# Patient Record
Sex: MALE | Race: WHITE | NOT HISPANIC OR LATINO | Employment: FULL TIME | URBAN - METROPOLITAN AREA
[De-identification: names, ages, dates, MRNs, and addresses within clinical notes are randomized per-mention and may not be internally consistent; named-entity substitution may affect disease eponyms.]

---

## 2018-07-24 ENCOUNTER — OFFICE VISIT (OUTPATIENT)
Dept: INTERNAL MEDICINE | Facility: CLINIC | Age: 58
End: 2018-07-24
Payer: COMMERCIAL

## 2018-07-24 VITALS
OXYGEN SATURATION: 98 % | WEIGHT: 187 LBS | RESPIRATION RATE: 10 BRPM | DIASTOLIC BLOOD PRESSURE: 70 MMHG | HEART RATE: 74 BPM | BODY MASS INDEX: 36.71 KG/M2 | HEIGHT: 60 IN | SYSTOLIC BLOOD PRESSURE: 118 MMHG

## 2018-07-24 DIAGNOSIS — Z00.00 WELLNESS EXAMINATION: Primary | ICD-10-CM

## 2018-07-24 PROCEDURE — 99396 PREV VISIT EST AGE 40-64: CPT | Mod: ,,, | Performed by: INTERNAL MEDICINE

## 2018-07-24 RX ORDER — THYROID, PORCINE 90 MG/1
90 TABLET ORAL DAILY
COMMUNITY
Start: 2018-07-13 | End: 2022-03-14

## 2018-07-24 RX ORDER — CYANOCOBALAMIN 1000 UG/ML
INJECTION, SOLUTION INTRAMUSCULAR; SUBCUTANEOUS
Refills: 2 | COMMUNITY
Start: 2018-07-05 | End: 2022-03-14

## 2018-07-24 RX ORDER — CHOLECALCIFEROL (VITAMIN D3) 50 MCG
50000 TABLET ORAL
COMMUNITY
Start: 2018-07-05

## 2018-07-24 RX ORDER — LIFITEGRAST 50 MG/ML
1 SOLUTION/ DROPS OPHTHALMIC 2 TIMES DAILY
COMMUNITY
Start: 2018-07-05 | End: 2020-08-17

## 2018-07-24 NOTE — PROGRESS NOTES
SUBJECTIVE:    Patient ID: Ameya Burleson is a 57 y.o. male.    Chief Complaint: Annual Exam (go over test results on his lap top/ script for massage); Urinary Frequency; and Fatigue    HPI     Pt in for annual exam--he brought his labs to review-he will send us his values-all good      Past Medical History:   Diagnosis Date    Hyperthyroidism      Social History     Social History    Marital status:      Spouse name: N/A    Number of children: N/A    Years of education: N/A     Occupational History    Not on file.     Social History Main Topics    Smoking status: Never Smoker    Smokeless tobacco: Never Used    Alcohol use No    Drug use: No    Sexual activity: Not on file     Other Topics Concern    Not on file     Social History Narrative    No narrative on file     History reviewed. No pertinent surgical history.  History reviewed. No pertinent family history.  Vitals:    07/24/18 1007   BP: 118/70   Pulse: 74   Resp: 10   SpO2: 98%   Weight: 84.8 kg (187 lb)   Height: 5' (1.524 m)       Review of Systems   Constitutional: Negative for appetite change, chills, diaphoresis, fatigue, fever and unexpected weight change.   HENT: Negative for congestion, ear pain, hearing loss, nosebleeds, postnasal drip, sinus pain, sinus pressure, sneezing, sore throat, tinnitus and trouble swallowing.    Eyes: Negative for photophobia, pain and visual disturbance.   Respiratory: Negative for apnea, cough, choking, chest tightness, shortness of breath and wheezing.    Cardiovascular: Negative for chest pain, palpitations and leg swelling.   Gastrointestinal: Negative for abdominal distention, abdominal pain, blood in stool, constipation, diarrhea, nausea and vomiting.   Endocrine: Negative for cold intolerance, heat intolerance, polydipsia and polyuria.   Genitourinary: Positive for frequency. Negative for difficulty urinating, dysuria, flank pain, hematuria and urgency.   Musculoskeletal: Negative for  arthralgias, back pain, joint swelling, myalgias, neck pain and neck stiffness.   Skin: Negative for pallor and rash.   Allergic/Immunologic: Negative for environmental allergies and food allergies.   Neurological: Negative for dizziness, tremors, seizures, syncope, speech difficulty, weakness, light-headedness, numbness and headaches.   Hematological: Does not bruise/bleed easily.   Psychiatric/Behavioral: Negative for agitation, confusion, decreased concentration, sleep disturbance and suicidal ideas. The patient is not nervous/anxious.           Objective:      Physical Exam   Constitutional: He is oriented to person, place, and time. He appears well-developed and well-nourished. He is cooperative. No distress.   HENT:   Head: Normocephalic and atraumatic.   Nose: Nose normal.   Mouth/Throat: Uvula is midline, oropharynx is clear and moist and mucous membranes are normal.   Eyes: Conjunctivae, EOM and lids are normal. Pupils are equal, round, and reactive to light. Right pupil is round and reactive. Left pupil is round and reactive.   Neck: Normal range of motion. Neck supple. Carotid bruit is not present. No thyromegaly present.   Cardiovascular: Normal rate, regular rhythm, normal heart sounds and intact distal pulses.    Pulmonary/Chest: Effort normal and breath sounds normal.   Abdominal: Soft. Bowel sounds are normal. He exhibits no mass. There is no hepatosplenomegaly. There is no tenderness. There is no rigidity. No hernia.   Genitourinary:   Genitourinary Comments: Prostate normal   Musculoskeletal: Normal range of motion.   Lymphadenopathy:     He has no cervical adenopathy.     He has no axillary adenopathy.   Neurological: He is alert and oriented to person, place, and time.   Skin: Skin is warm and dry. No cyanosis. Nails show no clubbing.   Psychiatric: He has a normal mood and affect. His speech is normal.   Nursing note and vitals reviewed.          Assessment:       1. Wellness examination          Plan:           Wellness examination

## 2019-08-14 ENCOUNTER — OFFICE VISIT (OUTPATIENT)
Dept: FAMILY MEDICINE | Facility: CLINIC | Age: 59
End: 2019-08-14
Payer: COMMERCIAL

## 2019-08-14 VITALS
WEIGHT: 180 LBS | TEMPERATURE: 99 F | OXYGEN SATURATION: 98 % | HEART RATE: 76 BPM | RESPIRATION RATE: 14 BRPM | BODY MASS INDEX: 35.34 KG/M2 | HEIGHT: 60 IN | SYSTOLIC BLOOD PRESSURE: 130 MMHG | DIASTOLIC BLOOD PRESSURE: 82 MMHG

## 2019-08-14 DIAGNOSIS — Z12.5 SPECIAL SCREENING FOR MALIGNANT NEOPLASM OF PROSTATE: ICD-10-CM

## 2019-08-14 DIAGNOSIS — E29.1 TESTOSTERONE DEFICIENCY IN MALE: ICD-10-CM

## 2019-08-14 DIAGNOSIS — D75.1 POLYCYTHEMIA, SECONDARY: ICD-10-CM

## 2019-08-14 DIAGNOSIS — Z00.00 ROUTINE GENERAL MEDICAL EXAMINATION AT A HEALTH CARE FACILITY: Primary | ICD-10-CM

## 2019-08-14 DIAGNOSIS — E03.9 HYPOTHYROIDISM (ACQUIRED): ICD-10-CM

## 2019-08-14 DIAGNOSIS — E55.9 VITAMIN D DEFICIENCY: ICD-10-CM

## 2019-08-14 DIAGNOSIS — M15.9 PRIMARY OSTEOARTHRITIS INVOLVING MULTIPLE JOINTS: ICD-10-CM

## 2019-08-14 DIAGNOSIS — E78.00 ELEVATED LDL CHOLESTEROL LEVEL: ICD-10-CM

## 2019-08-14 DIAGNOSIS — Z23 NEED FOR SHINGLES VACCINE: ICD-10-CM

## 2019-08-14 PROCEDURE — 99999 PR PBB SHADOW E&M-EST. PATIENT-LVL IV: CPT | Mod: PBBFAC,,, | Performed by: INTERNAL MEDICINE

## 2019-08-14 PROCEDURE — 99396 PR PREVENTIVE VISIT,EST,40-64: ICD-10-PCS | Mod: S$GLB,,, | Performed by: INTERNAL MEDICINE

## 2019-08-14 PROCEDURE — 99999 PR PBB SHADOW E&M-EST. PATIENT-LVL IV: ICD-10-PCS | Mod: PBBFAC,,, | Performed by: INTERNAL MEDICINE

## 2019-08-14 PROCEDURE — 99396 PREV VISIT EST AGE 40-64: CPT | Mod: S$GLB,,, | Performed by: INTERNAL MEDICINE

## 2019-08-14 RX ORDER — TADALAFIL 20 MG/1
20 TABLET ORAL DAILY
COMMUNITY
End: 2023-03-15

## 2019-08-14 RX ORDER — TESTOSTERONE CYPIONATE 200 MG/ML
100 INJECTION, SOLUTION INTRAMUSCULAR
Refills: 2 | COMMUNITY
Start: 2019-05-23 | End: 2022-03-14

## 2019-08-14 NOTE — PROGRESS NOTES
SUBJECTIVE:    Patient ID: Ameya Burleson is a 59 y.o. male.    Chief Complaint: Annual Exam    HPI     In for yearly check-      No visits with results within 6 Month(s) from this visit.   Latest known visit with results is:   No results found for any previous visit.       Past Medical History:   Diagnosis Date    Hyperthyroidism     Hypothyroidism (acquired) 8/14/2019    Vitamin D deficiency 8/14/2019     History reviewed. No pertinent surgical history.  Family History   Problem Relation Age of Onset    Vision loss Mother     Diabetes Father     Cancer Maternal Grandmother         skin       Marital Status:   Alcohol History:  reports that he does not drink alcohol.  Tobacco History:  reports that he has never smoked. He has never used smokeless tobacco.  Drug History:  reports that he does not use drugs.    Review of patient's allergies indicates:  No Known Allergies    Current Outpatient Medications:     ARMOUR THYROID 90 mg Tab, Take 90 mg by mouth once daily., Disp: , Rfl:     cholecalciferol, vitamin D3, 50,000 unit capsule, Take 50,000 Units by mouth once daily., Disp: , Rfl:     cyanocobalamin 1,000 mcg/mL injection, , Disp: , Rfl: 2    NON FORMULARY MEDICATION, DIM, Disp: , Rfl:     NON FORMULARY MEDICATION, adrecor, Disp: , Rfl:     NON FORMULARY MEDICATION, pregnenolone, Disp: , Rfl:     prasterone, dhea, (DHEA) 50 mg Tab, Take by mouth., Disp: , Rfl:     tadalafil (CIALIS) 20 MG Tab, Take 20 mg by mouth once daily., Disp: , Rfl:     testosterone cypionate (DEPOTESTOTERONE CYPIONATE) 200 mg/mL injection, Inject 100 mg into the muscle every 14 (fourteen) days. , Disp: , Rfl: 2    turmeric (CURCUMIN MISC), by Misc.(Non-Drug; Combo Route) route., Disp: , Rfl:     XIIDRA 5 % Dpet, Place 1 drop into both eyes 2 (two) times daily., Disp: , Rfl:     adjuvant AS01B, PF,vial 1 of 2 (SHINGRIX ADJUVANT COMPONENT-PF) Susp, Inject 0.5 mLs into the muscle once. for 1 dose, Disp: 0.5 mL, Rfl:  0    vit P2-hr-[id number]-me-B12-ALA (NUFOLA) 25-3.75-1-300 mg Cap, Take 1 capsule by mouth 2 (two) times daily., Disp: , Rfl:     Review of Systems   All other systems reviewed and are negative.         Objective:      Vitals:    08/14/19 1540   BP: 130/82   Pulse: 76   Resp: 14   Temp: 98.5 °F (36.9 °C)   SpO2: 98%   Weight: 81.6 kg (180 lb)   Height: 5' (1.524 m)     Physical Exam   Constitutional: He is oriented to person, place, and time. He appears well-developed and well-nourished. He is cooperative. No distress.   HENT:   Head: Normocephalic and atraumatic.   Mouth/Throat: Uvula is midline and mucous membranes are normal.   Eyes: Pupils are equal, round, and reactive to light. Conjunctivae and EOM are normal. Right eye exhibits no discharge. Left eye exhibits no discharge. No scleral icterus. Right pupil is round and reactive. Left pupil is round and reactive.   Neck: Normal range of motion. Neck supple. No JVD present. Carotid bruit is not present. No thyromegaly present.   Cardiovascular: Normal rate, regular rhythm, normal heart sounds and intact distal pulses. Exam reveals no gallop and no friction rub.   No murmur heard.  Pulmonary/Chest: Effort normal and breath sounds normal. He has no wheezes. He has no rales.   Abdominal: Soft. Bowel sounds are normal. He exhibits no distension, no abdominal bruit and no mass. There is no tenderness. There is no rigidity. No hernia.   Genitourinary: Prostate normal.   Musculoskeletal: Normal range of motion. He exhibits no edema.   Neurological: He is alert and oriented to person, place, and time.   Skin: Skin is warm and dry. Capillary refill takes less than 2 seconds. No lesion and no rash noted. No cyanosis. Nails show no clubbing.   Psychiatric: He has a normal mood and affect. His speech is normal and behavior is normal. Judgment and thought content normal.   Nursing note and vitals reviewed.        Assessment:       1. Routine general medical examination at a  health care facility    2. Hypothyroidism (acquired)    3. Vitamin D deficiency    4. Primary osteoarthritis involving multiple joints    5. Polycythemia, secondary    6. Elevated LDL cholesterol level    7. Testosterone deficiency in male    8. Special screening for malignant neoplasm of prostate    9. Need for shingles vaccine         Plan:       Routine general medical examination at a health care facility  -     Lipid panel; Future; Expected date: 08/14/2019  -     Cancel: Urinalysis    Hypothyroidism (acquired)  -     Cancel: TSH; Future; Expected date: 08/14/2019    Vitamin D deficiency  -     Vitamin D; Future; Expected date: 08/14/2019    Primary osteoarthritis involving multiple joints  -     Cancel: Comprehensive metabolic panel; Future; Expected date: 08/14/2019    Polycythemia, secondary  -     Cancel: CBC auto differential; Future; Expected date: 08/14/2019    Elevated LDL cholesterol level        -     Recheck LDL fasting    Testosterone deficiency in male        -     Evaluate levels    Special screening for malignant neoplasm of prostate  -     Cancel: PSA, Screening; Future; Expected date: 08/14/2019    Need for shingles vaccine  -     adjuvant AS01B, PF,vial 1 of 2 (SHINGRIX ADJUVANT COMPONENT-PF) Susp; Inject 0.5 mLs into the muscle once. for 1 dose  Dispense: 0.5 mL; Refill: 0      Follow up in about 1 year (around 8/14/2020) for annual exams.        8/14/2019 Dulce Knight M.D.

## 2020-08-06 ENCOUNTER — TELEPHONE (OUTPATIENT)
Dept: FAMILY MEDICINE | Facility: CLINIC | Age: 60
End: 2020-08-06

## 2020-08-06 DIAGNOSIS — E78.00 ELEVATED LDL CHOLESTEROL LEVEL: ICD-10-CM

## 2020-08-06 DIAGNOSIS — D75.1 POLYCYTHEMIA, SECONDARY: ICD-10-CM

## 2020-08-06 DIAGNOSIS — E03.9 HYPOTHYROIDISM (ACQUIRED): Primary | ICD-10-CM

## 2020-08-09 NOTE — PROGRESS NOTES
SUBJECTIVE:    Patient ID: Ameya Burleson is a 60 y.o. male.    Chief Complaint: Annual Exam    HPI     In for annual check.    Labs-laboratory studies include glucose of 86 sodium 140 potassium 4.0 BUN 19 creatinine 1.17 GFR 67 cc-cholesterol 181 HDL 36  TSH 3.34 hemoglobin 15.5 hematocrit 46.4 WBC 4400 platelet count [id number] wants to try natural approach to lowering cholesterol and he will recheck lipids in November-also [facility]    Health Maintenance         Date Due Completion Date    HIV Screening 07/28/1974 ---    Colorectal Cancer Screening 01/02/2000 1/1/2000 (Done)    Override on 1/1/2000: Done (mallory hope md)    Shingles Vaccine (1 of 2) 07/28/2009 ---    PROSTATE-SPECIFIC ANTIGEN 05/08/2019 5/8/2018 (Done)    Override on 5/8/2018: Done    Influenza Vaccine (1) 09/01/2020 11/29/2018    TETANUS VACCINE 02/05/2023 2/5/2013    Override on 2/5/2013: Done    Lipid Panel 11/11/2024 11/11/2019    Override on 5/8/2018: Done              Telephone on 08/06/2020   Component Date Value Ref Range Status    Glucose 08/12/2020 86  65 - 99 mg/dL Final    BUN, Bld 08/12/2020 19  7 - 25 mg/dL Final    Creatinine 08/12/2020 1.17  0.70 - 1.25 mg/dL Final    eGFR if non African American 08/12/2020 67  > OR = 60 mL/min/1.73m2 Final    eGFR if African American 08/12/2020 78  > OR = 60 mL/min/1.73m2 Final    BUN/Creatinine Ratio 08/12/2020 NOT APPLICABLE  6 - 22 (calc) Final    Sodium 08/12/2020 140  135 - 146 mmol/L Final    Potassium 08/12/2020 4.0  3.5 - 5.3 mmol/L Final    Chloride 08/12/2020 105  98 - 110 mmol/L Final    CO2 08/12/2020 28  20 - 32 mmol/L Final    Calcium 08/12/2020 8.9  8.6 - 10.3 mg/dL Final    Total Protein 08/12/2020 6.6  6.1 - 8.1 g/dL Final    Albumin 08/12/2020 4.0  3.6 - 5.1 g/dL Final    Globulin, Total 08/12/2020 2.6  1.9 - 3.7 g/dL (calc) Final    Albumin/Globulin Ratio 08/12/2020 1.5  1.0 - 2.5 (calc) Final    Total Bilirubin 08/12/2020 0.8  0.2 - 1.2 mg/dL Final     Alkaline Phosphatase 08/12/2020 68  35 - 144 U/L Final    AST 08/12/2020 21  10 - 35 U/L Final    ALT 08/12/2020 13  9 - 46 U/L Final    WBC 08/12/2020 4.4  3.8 - 10.8 Thousand/uL Final    RBC 08/12/2020 5.02  4.20 - 5.80 Million/uL Final    Hemoglobin 08/12/2020 15.5  13.2 - 17.1 g/dL Final    Hematocrit 08/12/2020 46.4  38.5 - 50.0 % Final    Mean Corpuscular Volume 08/12/2020 92.4  80.0 - 100.0 fL Final    Mean Corpuscular Hemoglobin 08/12/2020 30.9  27.0 - 33.0 pg Final    Mean Corpuscular Hemoglobin Conc 08/12/2020 33.4  32.0 - 36.0 g/dL Final    RDW 08/12/2020 12.8  11.0 - 15.0 % Final    Platelets 08/12/2020 112* 140 - 400 Thousand/uL Final    MPV 08/12/2020 10.8  7.5 - 12.5 fL Final    Neutrophils Absolute 08/12/2020 2,275  1,500 - 7,800 cells/uL Final    Lymph # 08/12/2020 1,452  850 - 3,900 cells/uL Final    Mono # 08/12/2020 502  200 - 950 cells/uL Final    Eos # 08/12/2020 110  15 - 500 cells/uL Final    Baso # 08/12/2020 62  0 - 200 cells/uL Final    Neutrophils Relative 08/12/2020 51.7  % Final    Lymph% 08/12/2020 33.0  % Final    Mono% 08/12/2020 11.4  % Final    Eosinophil% 08/12/2020 2.5  % Final    Basophil% 08/12/2020 1.4  % Final    TSH 08/12/2020 3.34  0.40 - 4.50 mIU/L Final    Cholesterol 08/12/2020 181  <200 mg/dL Final    HDL 08/12/2020 36* > OR = 40 mg/dL Final    Triglycerides 08/12/2020 96  <150 mg/dL Final    LDL Cholesterol 08/12/2020 125* mg/dL (calc) Final    Hdl/Cholesterol Ratio 08/12/2020 5.0* <5.0 (calc) Final    Non HDL Chol. (LDL+VLDL) 08/12/2020 145* <130 mg/dL (calc) Final       Past Medical History:   Diagnosis Date    Hyperthyroidism     Hypothyroidism (acquired) 8/14/2019    Vitamin D deficiency 8/14/2019     History reviewed. No pertinent surgical history.  Family History   Problem Relation Age of Onset    Vision loss Mother     Diabetes Father     Cancer Maternal Grandmother         skin       Marital Status:   Alcohol History:   reports no history of alcohol use.  Tobacco History:  reports that he has never smoked. He has never used smokeless tobacco.  Drug History:  reports no history of drug use.    Review of patient's allergies indicates:  No Known Allergies    Current Outpatient Medications:     ARMOUR THYROID 90 mg Tab, Take 90 mg by mouth once daily., Disp: , Rfl:     cholecalciferol, vitamin D3, 50,000 unit capsule, Take 50,000 Units by mouth once daily., Disp: , Rfl:     cyanocobalamin 1,000 mcg/mL injection, , Disp: , Rfl: 2    NON FORMULARY MEDICATION, DIM, Disp: , Rfl:     NON FORMULARY MEDICATION, pregnenolone, Disp: , Rfl:     tadalafil (CIALIS) 20 MG Tab, Take 20 mg by mouth once daily., Disp: , Rfl:     testosterone cypionate (DEPOTESTOTERONE CYPIONATE) 200 mg/mL injection, Inject 100 mg into the muscle every 14 (fourteen) days. , Disp: , Rfl: 2    turmeric (CURCUMIN MISC), by Misc.(Non-Drug; Combo Route) route., Disp: , Rfl:     UNABLE TO FIND, secretropin, Disp: , Rfl:     adjuvant AS01B, PF,vial 1 of 2 (SHINGRIX ADJUVANT COMPONENT-PF) Susp, Inject 0.5 mLs into the muscle once. for 1 dose, Disp: 0.5 mL, Rfl: 0    nystatin-triamcinolone (MYCOLOG II) cream, Apply topically 2 (two) times daily., Disp: 30 g, Rfl: 0    rosuvastatin (CRESTOR) 10 MG tablet, Take 1 tablet (10 mg total) by mouth once daily. (Patient not taking: Reported on 8/17/2020), Disp: 90 tablet, Rfl: 3    Review of Systems   Constitutional: Negative for appetite change, chills, diaphoresis, fatigue, fever and unexpected weight change.   HENT: Negative for congestion, ear pain, hearing loss, nosebleeds, postnasal drip, sinus pressure, sinus pain, sneezing, sore throat, tinnitus and trouble swallowing.    Eyes: Negative for photophobia, pain and visual disturbance.   Respiratory: Negative for apnea, cough, choking, chest tightness, shortness of breath and wheezing.    Cardiovascular: Negative for chest pain, palpitations and leg swelling.  "  Gastrointestinal: Negative for abdominal distention, abdominal pain, blood in stool, constipation, diarrhea, nausea and vomiting.   Endocrine: Negative for cold intolerance, heat intolerance, polydipsia and polyuria.   Genitourinary: Negative for difficulty urinating, dysuria, flank pain, frequency, hematuria and urgency.   Musculoskeletal: Negative for arthralgias, back pain, joint swelling, myalgias, neck pain and neck stiffness.   Skin: Negative for pallor and rash.   Allergic/Immunologic: Negative for environmental allergies and food allergies.   Neurological: Negative for dizziness, tremors, seizures, syncope, speech difficulty, weakness, light-headedness, numbness and headaches.   Hematological: Does not bruise/bleed easily.   Psychiatric/Behavioral: Negative for agitation, confusion, decreased concentration, sleep disturbance and suicidal ideas. The patient is not nervous/anxious.           Objective:      Vitals:    08/17/20 0826   BP: 120/74   Pulse: 74   Resp: 16   Temp: 97.8 °F (36.6 °C)   SpO2: 98%   Weight: 82.1 kg (181 lb)   Height: 5' 9.5" (1.765 m)     Physical Exam  Vitals signs and nursing note reviewed.   Constitutional:       General: He is not in acute distress.     Appearance: Normal appearance. He is well-developed. He is not ill-appearing, toxic-appearing or diaphoretic.   HENT:      Head: Normocephalic and atraumatic.      Right Ear: Tympanic membrane, ear canal and external ear normal.      Left Ear: Tympanic membrane, ear canal and external ear normal.      Nose: Nose normal.      Mouth/Throat:      Pharynx: Uvula midline.   Eyes:      General: No scleral icterus.        Right eye: No discharge.         Left eye: No discharge.      Conjunctiva/sclera: Conjunctivae normal.      Pupils: Pupils are equal, round, and reactive to light.      Right eye: Pupil is round and reactive.      Left eye: Pupil is round and reactive.   Neck:      Musculoskeletal: Normal range of motion and neck supple. " No neck rigidity or muscular tenderness.      Thyroid: No thyromegaly.      Vascular: No carotid bruit or JVD.   Cardiovascular:      Rate and Rhythm: Normal rate and regular rhythm.      Heart sounds: Normal heart sounds. No murmur. No friction rub. No gallop.    Pulmonary:      Effort: Pulmonary effort is normal. No respiratory distress.      Breath sounds: Normal breath sounds. No stridor. No wheezing, rhonchi or rales.   Abdominal:      General: Bowel sounds are normal. There is no distension or abdominal bruit.      Palpations: Abdomen is soft. Abdomen is not rigid. There is no mass.      Tenderness: There is no abdominal tenderness. There is no right CVA tenderness, left CVA tenderness, guarding or rebound.      Hernia: No hernia is present.   Musculoskeletal: Normal range of motion.   Lymphadenopathy:      Cervical: No cervical adenopathy.   Skin:     General: Skin is warm and dry.      Capillary Refill: Capillary refill takes less than 2 seconds.      Coloration: Skin is not jaundiced or pale.      Findings: No bruising, erythema, lesion or rash.      Nails: There is no clubbing.     Neurological:      General: No focal deficit present.      Mental Status: He is alert and oriented to person, place, and time. Mental status is at baseline.      Cranial Nerves: No cranial nerve deficit.      Sensory: No sensory deficit.      Motor: No weakness.      Coordination: Coordination normal.      Gait: Gait normal.      Deep Tendon Reflexes: Reflexes normal.   Psychiatric:         Speech: Speech normal.         Behavior: Behavior normal. Behavior is cooperative.         Thought Content: Thought content normal.         Judgment: Judgment normal.           Assessment:       1. Elevated LDL cholesterol level    2. Thrombocytopenia    3. Chronic kidney disease, stage III (moderate)    4. Hypothyroidism (acquired)    5. Need for shingles vaccine    6. Special screening, prostate cancer    7. Screen for colon cancer          Plan:       Elevated LDL cholesterol level  -     Cancel: Lipid Panel; Future; Expected date: 11/17/2020  -     Lipid Panel; Future; Expected date: 11/17/2020    Thrombocytopenia    Chronic kidney disease, stage III (moderate)  -     Cancel: Basic metabolic panel; Future; Expected date: 11/17/2020  -     Basic metabolic panel; Future; Expected date: 11/17/2020    Hypothyroidism (acquired)    Need for shingles vaccine  -     adjuvant AS01B, PF,vial 1 of 2 (SHINGRIX ADJUVANT COMPONENT-PF) Susp; Inject 0.5 mLs into the muscle once. for 1 dose  Dispense: 0.5 mL; Refill: 0    Special screening, prostate cancer  -     PSA, Screening; Future; Expected date: 08/17/2020    Screen for colon cancer  -     Ambulatory referral/consult to Gastroenterology; Future; Expected date: 08/24/2020      No follow-ups on file.        8/17/2020 Dulce Knight M.D.  Counseling and Referral of Other Preventative  (Italic type indicates deductible and co-insurance are waived)    Patient Name: Ameya Burleson  Today's Date: 8/17/2020    Health Maintenance       Date Due Completion Date    HIV Screening 07/28/1974 ---    Colorectal Cancer Screening 01/02/2000 1/1/2000 (Done)    Override on 1/1/2000: Done ([facility] md)    Shingles Vaccine (1 of 2) 07/28/2009 ---    PROSTATE-SPECIFIC ANTIGEN 05/08/2019 5/8/2018 (Done)    Override on 5/8/2018: Done    Influenza Vaccine (1) 09/01/2020 11/29/2018    TETANUS VACCINE 02/05/2023 2/5/2013    Override on 2/5/2013: Done    Lipid Panel 11/11/2024 11/11/2019    Override on 5/8/2018: Done        Orders Placed This Encounter   Procedures    PSA, Screening    Basic metabolic panel    Lipid Panel    Ambulatory referral/consult to Gastroenterology

## 2020-08-13 DIAGNOSIS — E78.00 ELEVATED LDL CHOLESTEROL LEVEL: Primary | ICD-10-CM

## 2020-08-13 RX ORDER — ROSUVASTATIN CALCIUM 10 MG/1
10 TABLET, COATED ORAL DAILY
Qty: 90 TABLET | Refills: 3 | Status: SHIPPED | OUTPATIENT
Start: 2020-08-13 | End: 2022-03-14

## 2020-08-13 NOTE — TELEPHONE ENCOUNTER
----- Message from Dulce Knight MD sent at 8/13/2020  8:20 AM CDT -----  Please call the patient regarding his abnormal result.-the HDL is low the LDL is too high--needs crestor 10 mg and recheck lipids and hepatic function in 6 months

## 2020-08-14 ENCOUNTER — TELEPHONE (OUTPATIENT)
Dept: FAMILY MEDICINE | Facility: CLINIC | Age: 60
End: 2020-08-14

## 2020-08-14 DIAGNOSIS — E78.00 ELEVATED LDL CHOLESTEROL LEVEL: Primary | ICD-10-CM

## 2020-08-14 LAB
ALBUMIN SERPL-MCNC: 4 G/DL (ref 3.6–5.1)
ALBUMIN/GLOB SERPL: 1.5 (CALC) (ref 1–2.5)
ALP SERPL-CCNC: 68 U/L (ref 35–144)
ALT SERPL-CCNC: 13 U/L (ref 9–46)
AST SERPL-CCNC: 21 U/L (ref 10–35)
BASOPHILS # BLD AUTO: 62 CELLS/UL (ref 0–200)
BASOPHILS NFR BLD AUTO: 1.4 %
BILIRUB SERPL-MCNC: 0.8 MG/DL (ref 0.2–1.2)
BUN SERPL-MCNC: 19 MG/DL (ref 7–25)
BUN/CREAT SERPL: NORMAL (CALC) (ref 6–22)
CALCIUM SERPL-MCNC: 8.9 MG/DL (ref 8.6–10.3)
CHLORIDE SERPL-SCNC: 105 MMOL/L (ref 98–110)
CHOLEST SERPL-MCNC: 181 MG/DL
CHOLEST/HDLC SERPL: 5 (CALC)
CO2 SERPL-SCNC: 28 MMOL/L (ref 20–32)
CREAT SERPL-MCNC: 1.17 MG/DL (ref 0.7–1.25)
EOSINOPHIL # BLD AUTO: 110 CELLS/UL (ref 15–500)
EOSINOPHIL NFR BLD AUTO: 2.5 %
ERYTHROCYTE [DISTWIDTH] IN BLOOD BY AUTOMATED COUNT: 12.8 % (ref 11–15)
GFRSERPLBLD MDRD-ARVRAT: 67 ML/MIN/1.73M2
GLOBULIN SER CALC-MCNC: 2.6 G/DL (CALC) (ref 1.9–3.7)
GLUCOSE SERPL-MCNC: 86 MG/DL (ref 65–99)
HCT VFR BLD AUTO: 46.4 % (ref 38.5–50)
HDLC SERPL-MCNC: 36 MG/DL
HGB BLD-MCNC: 15.5 G/DL (ref 13.2–17.1)
LDLC SERPL CALC-MCNC: 125 MG/DL (CALC)
LYMPHOCYTES # BLD AUTO: 1452 CELLS/UL (ref 850–3900)
LYMPHOCYTES NFR BLD AUTO: 33 %
MCH RBC QN AUTO: 30.9 PG (ref 27–33)
MCHC RBC AUTO-ENTMCNC: 33.4 G/DL (ref 32–36)
MCV RBC AUTO: 92.4 FL (ref 80–100)
MONOCYTES # BLD AUTO: 502 CELLS/UL (ref 200–950)
MONOCYTES NFR BLD AUTO: 11.4 %
NEUTROPHILS # BLD AUTO: 2275 CELLS/UL (ref 1500–7800)
NEUTROPHILS NFR BLD AUTO: 51.7 %
NONHDLC SERPL-MCNC: 145 MG/DL (CALC)
PLATELET # BLD AUTO: 112 THOUSAND/UL (ref 140–400)
PMV BLD REES-ECKER: 10.8 FL (ref 7.5–12.5)
POTASSIUM SERPL-SCNC: 4 MMOL/L (ref 3.5–5.3)
PROT SERPL-MCNC: 6.6 G/DL (ref 6.1–8.1)
RBC # BLD AUTO: 5.02 MILLION/UL (ref 4.2–5.8)
SODIUM SERPL-SCNC: 140 MMOL/L (ref 135–146)
TRIGL SERPL-MCNC: 96 MG/DL
TSH SERPL-ACNC: 3.34 MIU/L (ref 0.4–4.5)
WBC # BLD AUTO: 4.4 THOUSAND/UL (ref 3.8–10.8)

## 2020-08-17 ENCOUNTER — OFFICE VISIT (OUTPATIENT)
Dept: FAMILY MEDICINE | Facility: CLINIC | Age: 60
End: 2020-08-17
Payer: COMMERCIAL

## 2020-08-17 VITALS
WEIGHT: 181 LBS | OXYGEN SATURATION: 98 % | HEIGHT: 70 IN | RESPIRATION RATE: 16 BRPM | HEART RATE: 74 BPM | DIASTOLIC BLOOD PRESSURE: 74 MMHG | TEMPERATURE: 98 F | SYSTOLIC BLOOD PRESSURE: 120 MMHG | BODY MASS INDEX: 25.91 KG/M2

## 2020-08-17 DIAGNOSIS — Z23 NEED FOR SHINGLES VACCINE: ICD-10-CM

## 2020-08-17 DIAGNOSIS — E03.9 HYPOTHYROIDISM (ACQUIRED): ICD-10-CM

## 2020-08-17 DIAGNOSIS — N18.30 CHRONIC KIDNEY DISEASE, STAGE III (MODERATE): ICD-10-CM

## 2020-08-17 DIAGNOSIS — Z12.11 SCREEN FOR COLON CANCER: ICD-10-CM

## 2020-08-17 DIAGNOSIS — B37.2 YEAST DERMATITIS: Primary | ICD-10-CM

## 2020-08-17 DIAGNOSIS — D69.6 THROMBOCYTOPENIA: ICD-10-CM

## 2020-08-17 DIAGNOSIS — E78.00 ELEVATED LDL CHOLESTEROL LEVEL: Primary | ICD-10-CM

## 2020-08-17 DIAGNOSIS — B37.2 YEAST DERMATITIS: ICD-10-CM

## 2020-08-17 DIAGNOSIS — Z12.5 SPECIAL SCREENING, PROSTATE CANCER: ICD-10-CM

## 2020-08-17 PROCEDURE — 99396 PREV VISIT EST AGE 40-64: CPT | Mod: S$GLB,,, | Performed by: INTERNAL MEDICINE

## 2020-08-17 PROCEDURE — 99396 PR PREVENTIVE VISIT,EST,40-64: ICD-10-PCS | Mod: S$GLB,,, | Performed by: INTERNAL MEDICINE

## 2020-08-17 RX ORDER — ADJUVANT AS01B/PF, VIAL 1 OF 2
1 VIAL (ML) INTRAMUSCULAR ONCE
Qty: 0.5 ML | Refills: 0 | Status: SHIPPED | OUTPATIENT
Start: 2020-08-17 | End: 2020-08-17

## 2020-08-17 RX ORDER — NYSTATIN AND TRIAMCINOLONE ACETONIDE 100000; 1 [USP'U]/G; MG/G
CREAM TOPICAL 2 TIMES DAILY
Qty: 30 G | Refills: 0 | Status: SHIPPED | OUTPATIENT
Start: 2020-08-17 | End: 2023-03-15

## 2020-08-17 RX ORDER — NYSTATIN AND TRIAMCINOLONE ACETONIDE 100000; 1 [USP'U]/G; MG/G
CREAM TOPICAL 2 TIMES DAILY
Qty: 30 G | Refills: 0 | Status: SHIPPED | OUTPATIENT
Start: 2020-08-17 | End: 2020-08-17 | Stop reason: SDUPTHER

## 2022-03-13 NOTE — PROGRESS NOTES
SUBJECTIVE:    Patient ID: Ameya Burleson is a 61 y.o. male.    Chief Complaint: Annual Exam    HPI      Patient comes in for annual exam he brings labs testing with him cholesterol 171 HDL 41  triglyceride 83 BUN 16 creatinine 1.07 sodium 141 potassium 4.1 H&H 15.9 and 46.3  Platelets low as usual 111,000 white cells 4000 testosterone 34.5 A1c 5.3 homocystine 7 iron levels normal PSA 0.74 DHEA 43 vitamin-D 109 magnesium 4.9    Patient is doing well-says Cialis caused some brain fog-he is using miniscule amounts of compounded viagra which works very well           Date Due Completion Date    COVID-19 Vaccine (1) Never done ---    HIV Screening Never done ---    Shingles Vaccine (1 of 2) Never done ---    Colorectal Cancer Screening 01/01/2010 1/1/2000 (Done)    Override on 1/1/2000: Done (mallory hope md)    PROSTATE-SPECIFIC ANTIGEN 05/08/2019 5/8/2018 (Done)     141 potassium Override on 5/8/2018: Done    Influenza Vaccine (1) 09/01/2021 11/29/2018    TETANUS VACCINE 02/05/2023 2/5/2013    Override on 2/5/2013: Done    Lipid Panel 08/12/2025 8/12/2020    Override on 5/8/2018: Done              Admission on 09/01/2021, Discharged on 09/02/2021   Component Date Value Ref Range Status    WBC 09/01/2021 7.62  3.90 - 12.70 K/uL Final    RBC 09/01/2021 5.15  4.60 - 6.20 M/uL Final    Hemoglobin 09/01/2021 15.8  14.0 - 18.0 g/dL Final    Hematocrit 09/01/2021 43.3  40.0 - 54.0 % Final    MCV 09/01/2021 84  82 - 98 fL Final    MCH 09/01/2021 30.7  27.0 - 31.0 pg Final    MCHC 09/01/2021 36.5 (A) 32.0 - 36.0 g/dL Final    RDW 09/01/2021 11.8  11.5 - 14.5 % Final    Platelets 09/01/2021 118 (A) 150 - 450 K/uL Final    MPV 09/01/2021 10.3  9.2 - 12.9 fL Final    Immature Granulocytes 09/01/2021 0.3  0.0 - 0.5 % Final    Gran # (ANC) 09/01/2021 4.8  1.8 - 7.7 K/uL Final    Immature Grans (Abs) 09/01/2021 0.02  0.00 - 0.04 K/uL Final    Lymph # 09/01/2021 1.9  1.0 - 4.8 K/uL Final    Mono # 09/01/2021  0.8  0.3 - 1.0 K/uL Final    Eos # 09/01/2021 0.1  0.0 - 0.5 K/uL Final    Baso # 09/01/2021 0.06  0.00 - 0.20 K/uL Final    nRBC 09/01/2021 0  0 /100 WBC Final    Gran % 09/01/2021 63.0  38.0 - 73.0 % Final    Lymph % 09/01/2021 24.3  18.0 - 48.0 % Final    Mono % 09/01/2021 10.9  4.0 - 15.0 % Final    Eosinophil % 09/01/2021 0.7  0.0 - 8.0 % Final    Basophil % 09/01/2021 0.8  0.0 - 1.9 % Final    Differential Method 09/01/2021 Automated   Final    Sodium 09/01/2021 135 (A) 136 - 145 mmol/L Final    Potassium 09/01/2021 3.5  3.5 - 5.1 mmol/L Final    Chloride 09/01/2021 100  95 - 110 mmol/L Final    CO2 09/01/2021 21 (A) 22 - 31 mmol/L Final    Glucose 09/01/2021 112 (A) 70 - 110 mg/dL Final    BUN 09/01/2021 25 (A) 9 - 21 mg/dL Final    Creatinine 09/01/2021 1.38  0.50 - 1.40 mg/dL Final    Calcium 09/01/2021 9.8  8.4 - 10.2 mg/dL Final    Total Protein 09/01/2021 7.9  6.0 - 8.4 g/dL Final    Albumin 09/01/2021 4.6  3.5 - 5.2 g/dL Final    Total Bilirubin 09/01/2021 0.8  0.2 - 1.3 mg/dL Final    Alkaline Phosphatase 09/01/2021 84  38 - 145 U/L Final    AST 09/01/2021 48  17 - 59 U/L Final    ALT 09/01/2021 31  0 - 50 U/L Final    Anion Gap 09/01/2021 14  8 - 16 mmol/L Final    eGFR if African American 09/01/2021 >60  >60 mL/min/1.73 m^2 Final    eGFR if non African American 09/01/2021 54 (A) >60 mL/min/1.73 m^2 Final       Past Medical History:   Diagnosis Date    Hyperthyroidism     Hypothyroidism (acquired) 8/14/2019    Vitamin D deficiency 8/14/2019     History reviewed. No pertinent surgical history.  Family History   Problem Relation Age of Onset    Vision loss Mother     Diabetes Father     Cancer Maternal Grandmother         skin       Marital Status:   Alcohol History:  reports no history of alcohol use.  Tobacco History:  reports that he has never smoked. He has never used smokeless tobacco.  Drug History:  reports no history of drug use.    Review of patient's  allergies indicates:  No Known Allergies    Current Outpatient Medications:     ARMOUR THYROID 120 mg Tab, Take 1 tablet by mouth every morning., Disp: , Rfl:     cholecalciferol, vitamin D3, (VITAMIN D3) 50 mcg (2,000 unit) Tab, Take 50,000 Units by mouth 3 (three) times a week., Disp: , Rfl:     NASCOBAL 500 mcg/spray nasal spray, 500 mcg by Nasal route once a week., Disp: , Rfl:     NON FORMULARY MEDICATION, DIM, Disp: , Rfl:     NON FORMULARY MEDICATION, pregnenolone, Disp: , Rfl:     nystatin-triamcinolone (MYCOLOG II) cream, Apply topically 2 (two) times daily., Disp: 30 g, Rfl: 0    tadalafil (CIALIS) 20 MG Tab, Take 20 mg by mouth once daily., Disp: , Rfl:     turmeric (CURCUMIN MISC), by Misc.(Non-Drug; Combo Route) route., Disp: , Rfl:     UNABLE TO FIND, Silverio bain, Disp: , Rfl:     Review of Systems   Constitutional: Negative for appetite change, chills, diaphoresis, fatigue, fever and unexpected weight change.   HENT: Negative for congestion, ear pain, hearing loss, nosebleeds, postnasal drip, sinus pressure, sinus pain, sneezing, sore throat and trouble swallowing.    Eyes: Negative for photophobia, pain and visual disturbance.   Respiratory: Negative for apnea, cough, choking, chest tightness, shortness of breath and wheezing.    Cardiovascular: Negative for chest pain, palpitations and leg swelling.   Gastrointestinal: Negative for abdominal distention, abdominal pain, blood in stool, constipation, diarrhea, nausea and vomiting.   Endocrine: Negative for cold intolerance, heat intolerance, polydipsia and polyuria.   Genitourinary: Negative for difficulty urinating, dysuria, flank pain, frequency, hematuria and urgency.   Musculoskeletal: Positive for neck pain. Negative for arthralgias, back pain, joint swelling, myalgias and neck stiffness.   Skin: Negative for pallor and rash.   Allergic/Immunologic: Negative for environmental allergies and food allergies.   Neurological: Negative for  "dizziness, tremors, seizures, syncope, speech difficulty, weakness, light-headedness, numbness and headaches.   Hematological: Does not bruise/bleed easily.   Psychiatric/Behavioral: Negative for agitation, confusion, decreased concentration, sleep disturbance and suicidal ideas. The patient is not nervous/anxious.           Objective:      Vitals:    03/14/22 1441   BP: 116/78   Pulse: 72   Resp: 12   Temp: 98.5 °F (36.9 °C)   SpO2: 97%   Weight: 84.4 kg (186 lb)   Height: 5' 9" (1.753 m)     Physical Exam  Constitutional:       Appearance: Normal appearance.   HENT:      Head: Normocephalic and atraumatic.   Eyes:      Extraocular Movements: Extraocular movements intact.      Pupils: Pupils are equal, round, and reactive to light.   Neck:      Vascular: No carotid bruit.   Cardiovascular:      Rate and Rhythm: Normal rate and regular rhythm.      Pulses: Normal pulses.      Heart sounds: Normal heart sounds.   Pulmonary:      Effort: Pulmonary effort is normal.      Breath sounds: Normal breath sounds.   Abdominal:      General: Bowel sounds are normal.      Palpations: Abdomen is soft.   Musculoskeletal:      Cervical back: Normal range of motion and neck supple.      Right lower leg: No edema.      Left lower leg: No edema.   Lymphadenopathy:      Cervical: No cervical adenopathy.   Skin:     General: Skin is warm and dry.      Capillary Refill: Capillary refill takes less than 2 seconds.   Neurological:      General: No focal deficit present.      Mental Status: He is alert and oriented to person, place, and time.   Psychiatric:         Mood and Affect: Mood normal.         Behavior: Behavior normal.         Thought Content: Thought content normal.         Judgment: Judgment normal.           Assessment:       1. Thrombocytopenia    2. Stage 3a chronic kidney disease    3. Encounter for screening for HIV    4. Special screening for malignant neoplasm of prostate    5. Special screening for malignant neoplasms, " colon    6. Acquired hypothyroidism         Plan:       Thrombocytopenia    Stage 3a chronic kidney disease    Encounter for screening for HIV    Special screening for malignant neoplasm of prostate    Special screening for malignant neoplasms, colon    Acquired hypothyroidism      No follow-ups on file.        3/14/2022 Dulce Knight M.D.

## 2022-03-14 ENCOUNTER — OFFICE VISIT (OUTPATIENT)
Dept: FAMILY MEDICINE | Facility: CLINIC | Age: 62
End: 2022-03-14
Payer: COMMERCIAL

## 2022-03-14 VITALS
HEART RATE: 72 BPM | WEIGHT: 186 LBS | BODY MASS INDEX: 27.55 KG/M2 | SYSTOLIC BLOOD PRESSURE: 116 MMHG | DIASTOLIC BLOOD PRESSURE: 78 MMHG | RESPIRATION RATE: 12 BRPM | TEMPERATURE: 99 F | HEIGHT: 69 IN | OXYGEN SATURATION: 97 %

## 2022-03-14 DIAGNOSIS — D69.6 THROMBOCYTOPENIA: Primary | ICD-10-CM

## 2022-03-14 DIAGNOSIS — E03.9 ACQUIRED HYPOTHYROIDISM: ICD-10-CM

## 2022-03-14 DIAGNOSIS — Z11.4 ENCOUNTER FOR SCREENING FOR HIV: ICD-10-CM

## 2022-03-14 DIAGNOSIS — Z12.5 SPECIAL SCREENING FOR MALIGNANT NEOPLASM OF PROSTATE: ICD-10-CM

## 2022-03-14 DIAGNOSIS — N18.31 STAGE 3A CHRONIC KIDNEY DISEASE: ICD-10-CM

## 2022-03-14 DIAGNOSIS — Z12.11 SPECIAL SCREENING FOR MALIGNANT NEOPLASMS, COLON: ICD-10-CM

## 2022-03-14 PROCEDURE — 99396 PREV VISIT EST AGE 40-64: CPT | Mod: S$GLB,,, | Performed by: INTERNAL MEDICINE

## 2022-03-14 PROCEDURE — 99396 PR PREVENTIVE VISIT,EST,40-64: ICD-10-PCS | Mod: S$GLB,,, | Performed by: INTERNAL MEDICINE

## 2022-03-14 RX ORDER — THYROID, PORCINE 120 MG/1
1 TABLET ORAL EVERY MORNING
COMMUNITY
Start: 2022-02-28 | End: 2023-03-15 | Stop reason: SDUPTHER

## 2022-03-14 RX ORDER — CYANOCOBALAMIN 500 UG/1
500 SPRAY NASAL WEEKLY
COMMUNITY
Start: 2022-03-03

## 2023-03-15 ENCOUNTER — OFFICE VISIT (OUTPATIENT)
Dept: FAMILY MEDICINE | Facility: CLINIC | Age: 63
End: 2023-03-15
Payer: COMMERCIAL

## 2023-03-15 VITALS
OXYGEN SATURATION: 98 % | WEIGHT: 180 LBS | HEART RATE: 80 BPM | HEIGHT: 69 IN | RESPIRATION RATE: 14 BRPM | SYSTOLIC BLOOD PRESSURE: 134 MMHG | TEMPERATURE: 99 F | BODY MASS INDEX: 26.66 KG/M2 | DIASTOLIC BLOOD PRESSURE: 82 MMHG

## 2023-03-15 DIAGNOSIS — J40 BRONCHITIS: ICD-10-CM

## 2023-03-15 DIAGNOSIS — Z00.00 ANNUAL PHYSICAL EXAM: Primary | ICD-10-CM

## 2023-03-15 DIAGNOSIS — F41.8 SITUATIONAL ANXIETY: ICD-10-CM

## 2023-03-15 DIAGNOSIS — J40 BRONCHITIS: Primary | ICD-10-CM

## 2023-03-15 DIAGNOSIS — E03.9 ACQUIRED HYPOTHYROIDISM: ICD-10-CM

## 2023-03-15 DIAGNOSIS — D69.6 THROMBOCYTOPENIA: ICD-10-CM

## 2023-03-15 DIAGNOSIS — E03.9 HYPOTHYROIDISM (ACQUIRED): ICD-10-CM

## 2023-03-15 PROCEDURE — 99396 PREV VISIT EST AGE 40-64: CPT | Mod: S$GLB,,, | Performed by: INTERNAL MEDICINE

## 2023-03-15 PROCEDURE — 99396 PR PREVENTIVE VISIT,EST,40-64: ICD-10-PCS | Mod: S$GLB,,, | Performed by: INTERNAL MEDICINE

## 2023-03-15 RX ORDER — THYROID, PORCINE 120 MG/1
120 TABLET ORAL EVERY MORNING
Qty: 90 TABLET | Refills: 3 | Status: CANCELLED | OUTPATIENT
Start: 2023-03-15

## 2023-03-15 RX ORDER — AMOXICILLIN 500 MG/1
500 TABLET, FILM COATED ORAL EVERY 8 HOURS
Qty: 30 TABLET | Refills: 0 | Status: SHIPPED | OUTPATIENT
Start: 2023-03-15 | End: 2023-03-25

## 2023-03-15 RX ORDER — TESTOSTERONE ENANTHATE 50 MG/.5ML
INJECTION SUBCUTANEOUS
COMMUNITY
Start: 2023-02-09 | End: 2024-03-18

## 2023-03-15 RX ORDER — THYROID, PORCINE 120 MG/1
120 TABLET ORAL EVERY MORNING
Qty: 90 TABLET | Refills: 3 | Status: SHIPPED | OUTPATIENT
Start: 2023-03-15 | End: 2024-03-18 | Stop reason: SDUPTHER

## 2023-03-15 RX ORDER — BENZONATATE 200 MG/1
200 CAPSULE ORAL 3 TIMES DAILY PRN
Qty: 30 CAPSULE | Refills: 0 | Status: SHIPPED | OUTPATIENT
Start: 2023-03-15 | End: 2023-03-15

## 2023-03-15 RX ORDER — SILDENAFIL 25 MG/1
25 TABLET, FILM COATED ORAL DAILY PRN
COMMUNITY
End: 2024-03-18

## 2023-03-15 RX ORDER — AMOXICILLIN 500 MG/1
500 TABLET, FILM COATED ORAL EVERY 8 HOURS
Qty: 30 TABLET | Refills: 0 | Status: SHIPPED | OUTPATIENT
Start: 2023-03-15 | End: 2023-03-15

## 2023-03-15 RX ORDER — BENZONATATE 200 MG/1
200 CAPSULE ORAL 3 TIMES DAILY PRN
Qty: 30 CAPSULE | Refills: 0 | Status: SHIPPED | OUTPATIENT
Start: 2023-03-15 | End: 2023-03-25

## 2023-03-15 NOTE — PROGRESS NOTES
SUBJECTIVE:    Patient ID: Ameya Burlseon is a 62 y.o. male.    Chief Complaint: Annual Exam    HPI    Patient in for annual exam--    He ia a happy patient-good life-he did have some reaction to some pollen and needs something for cough-no shortness of breath-    He is taking some testosterone these days-small amount    Health Maintenance         Date Due Completion Date    PROSTATE-SPECIFIC ANTIGEN 11/18/2022 11/18/2021    Override on 5/8/2018: Done    TETANUS VACCINE 03/15/2023 (Originally 2/5/2023) 2/5/2013    Override on 2/5/2013: Done    HIV Screening 03/15/2024 (Originally 7/28/1975) ---    COVID-19 Vaccine (3 - Booster for Pfizer series) 03/15/2024 (Originally 11/29/2021) 10/4/2021    Hemoglobin A1c (Diabetic Prevention Screening) 11/18/2024 11/18/2021    Lipid Panel 11/18/2026 11/18/2021    Override on 5/8/2018: Done    Colorectal Cancer Screening 02/05/2031 2/5/2021    Override on 1/1/2000: Done (mallory hope md)     I laboratory studies showed laboratory studies cholesterol 173 HDL 37  triglycerides 85 glucose 95 BUN 15 creatinine 1.02 GFR 83 cc sodium 140 potassium 4.2 calcium 9.1 total protein 6.6 bilirubin 0.6 alk-phos 63 ALT 43 white count 4.5 H&H 15.546.7 homocystine 6.3 DHEA 30 ferritin 97 PSA 0.82 free T3 3.9 free T4 1.0 cyst vitamin-D 77 magnesium 4.3 S bili 0.5     No visits with results within 1 Year(s) from this visit.   Latest known visit with results is:   Admission on 09/01/2021, Discharged on 09/02/2021   Component Date Value Ref Range Status    WBC 09/01/2021 7.62  3.90 - 12.70 K/uL Final    RBC 09/01/2021 5.15  4.60 - 6.20 M/uL Final    Hemoglobin 09/01/2021 15.8  14.0 - 18.0 g/dL Final    Hematocrit 09/01/2021 43.3  40.0 - 54.0 % Final    MCV 09/01/2021 84  82 - 98 fL Final    MCH 09/01/2021 30.7  27.0 - 31.0 pg Final    MCHC 09/01/2021 36.5 (H)  32.0 - 36.0 g/dL Final    RDW 09/01/2021 11.8  11.5 - 14.5 % Final    Platelets 09/01/2021 118 (L)  150 - 450 K/uL Final     MPV 09/01/2021 10.3  9.2 - 12.9 fL Final    Immature Granulocytes 09/01/2021 0.3  0.0 - 0.5 % Final    Gran # (ANC) 09/01/2021 4.8  1.8 - 7.7 K/uL Final    Immature Grans (Abs) 09/01/2021 0.02  0.00 - 0.04 K/uL Final    Lymph # 09/01/2021 1.9  1.0 - 4.8 K/uL Final    Mono # 09/01/2021 0.8  0.3 - 1.0 K/uL Final    Eos # 09/01/2021 0.1  0.0 - 0.5 K/uL Final    Baso # 09/01/2021 0.06  0.00 - 0.20 K/uL Final    nRBC 09/01/2021 0  0 /100 WBC Final    Gran % 09/01/2021 63.0  38.0 - 73.0 % Final    Lymph % 09/01/2021 24.3  18.0 - 48.0 % Final    Mono % 09/01/2021 10.9  4.0 - 15.0 % Final    Eosinophil % 09/01/2021 0.7  0.0 - 8.0 % Final    Basophil % 09/01/2021 0.8  0.0 - 1.9 % Final    Differential Method 09/01/2021 Automated   Final    Sodium 09/01/2021 135 (L)  136 - 145 mmol/L Final    Potassium 09/01/2021 3.5  3.5 - 5.1 mmol/L Final    Chloride 09/01/2021 100  95 - 110 mmol/L Final    CO2 09/01/2021 21 (L)  22 - 31 mmol/L Final    Glucose 09/01/2021 112 (H)  70 - 110 mg/dL Final    BUN 09/01/2021 25 (H)  9 - 21 mg/dL Final    Creatinine 09/01/2021 1.38  0.50 - 1.40 mg/dL Final    Calcium 09/01/2021 9.8  8.4 - 10.2 mg/dL Final    Total Protein 09/01/2021 7.9  6.0 - 8.4 g/dL Final    Albumin 09/01/2021 4.6  3.5 - 5.2 g/dL Final    Total Bilirubin 09/01/2021 0.8  0.2 - 1.3 mg/dL Final    Alkaline Phosphatase 09/01/2021 84  38 - 145 U/L Final    AST 09/01/2021 48  17 - 59 U/L Final    ALT 09/01/2021 31  0 - 50 U/L Final    Anion Gap 09/01/2021 14  8 - 16 mmol/L Final    eGFR if African American 09/01/2021 >60  >60 mL/min/1.73 m^2 Final    eGFR if non African American 09/01/2021 54 (A)  >60 mL/min/1.73 m^2 Final       Past Medical History:   Diagnosis Date    Hyperthyroidism     Hypothyroidism (acquired) 8/14/2019    Vitamin D deficiency 8/14/2019     History reviewed. No pertinent surgical history.  Family History   Problem Relation Age of Onset    Vision loss Mother     Diabetes  Father     Cancer Maternal Grandmother         skin       Marital Status:   Alcohol History:  reports no history of alcohol use.  Tobacco History:  reports that he has never smoked. He has never used smokeless tobacco.  Drug History:  reports no history of drug use.    Review of patient's allergies indicates:  No Known Allergies    Current Outpatient Medications:     ARMOUR THYROID 120 mg Tab, Take 1 tablet by mouth every morning., Disp: , Rfl:     cholecalciferol, vitamin D3, (VITAMIN D3) 50 mcg (2,000 unit) Tab, Take 50,000 Units by mouth 3 (three) times a week., Disp: , Rfl:     NASCOBAL 500 mcg/spray nasal spray, 500 mcg by Nasal route once a week., Disp: , Rfl:     NON FORMULARY MEDICATION, DIM, Disp: , Rfl:     NON FORMULARY MEDICATION, pregnenolone, Disp: , Rfl:     sildenafiL (VIAGRA) 25 MG tablet, Take 25 mg by mouth daily as needed for Erectile Dysfunction., Disp: , Rfl:     turmeric (CURCUMIN MISC), by Misc.(Non-Drug; Combo Route) route., Disp: , Rfl:     UNABLE TO FIND, Silverio bain, Disp: , Rfl:     XYOSTED 50 mg/0.5 mL AtIn, Inject into the skin., Disp: , Rfl:     Review of Systems   Constitutional:  Negative for appetite change, chills, diaphoresis, fatigue, fever and unexpected weight change.        Less energy than he used to have   HENT:  Negative for congestion, ear pain, hearing loss, nosebleeds, postnasal drip, sinus pressure, sinus pain, sneezing, sore throat and trouble swallowing.    Eyes:  Negative for photophobia, pain and visual disturbance.   Respiratory:  Positive for cough. Negative for apnea, choking, chest tightness, shortness of breath and wheezing.    Cardiovascular:  Negative for chest pain, palpitations and leg swelling.   Gastrointestinal:  Negative for abdominal distention, abdominal pain, blood in stool, constipation, diarrhea, nausea and vomiting.        Sometimes delayed digestion   Endocrine: Negative for cold intolerance, heat intolerance, polydipsia and  polyuria.   Genitourinary:  Positive for frequency. Negative for difficulty urinating, dysuria, flank pain, hematuria and urgency.   Musculoskeletal:  Negative for arthralgias, back pain, joint swelling, myalgias, neck pain and neck stiffness.   Skin:  Negative for pallor and rash.   Allergic/Immunologic: Negative for environmental allergies and food allergies.   Neurological:  Negative for dizziness, tremors, seizures, syncope, speech difficulty, weakness, light-headedness, numbness and headaches.   Hematological:  Does not bruise/bleed easily.   Psychiatric/Behavioral:  Negative for agitation, confusion, decreased concentration, sleep disturbance and suicidal ideas. The patient is nervous/anxious.         Objective:      Vitals    Vitals - 1 value per visit 9/1/2021 3/14/2022 3/14/2022 3/15/2023 3/15/2023   SYSTOLIC 136 - 116 - 134   DIASTOLIC 76 - 78 - 82   Pulse 80 - 72 - 80   Temp 98.1 - 98.5 - 99   Resp 16 - 12 - 14   SPO2 99 - 97 - 98   Weight (lb) 183.2 - 186 - 180   Weight (kg) 83.1 - 84.369 - 81.647   Height 69 - 69 - 69   BMI (Calculated) 27 - 27.5 - 26.6   VISIT REPORT - - - - -   Pain Score  - 0 - 0 -       Physical Exam  Constitutional:       General: He is not in acute distress.     Appearance: Normal appearance. He is not ill-appearing.   HENT:      Head: Normocephalic and atraumatic.      Nose: Congestion present.   Eyes:      Extraocular Movements: Extraocular movements intact.      Pupils: Pupils are equal, round, and reactive to light.   Neck:      Vascular: No carotid bruit.   Cardiovascular:      Rate and Rhythm: Normal rate and regular rhythm.      Pulses: Normal pulses.      Heart sounds: Normal heart sounds. No murmur heard.  Pulmonary:      Effort: Pulmonary effort is normal.      Breath sounds: Normal breath sounds. Rhonchi: bilateral.      Comments: Wet cough during exam  Abdominal:      General: Bowel sounds are normal.      Palpations: Abdomen is soft.   Musculoskeletal:          General: Normal range of motion.      Cervical back: Normal range of motion and neck supple.      Right lower leg: No edema.      Left lower leg: No edema.   Lymphadenopathy:      Cervical: No cervical adenopathy.   Skin:     General: Skin is warm and dry.      Capillary Refill: Capillary refill takes less than 2 seconds.   Neurological:      General: No focal deficit present.      Mental Status: He is alert and oriented to person, place, and time.   Psychiatric:         Mood and Affect: Mood normal.         Thought Content: Thought content normal.         Judgment: Judgment normal.         Assessment:       1. Hypothyroidism (acquired)           Plan:       Hypothyroidism (acquired)      No follow-ups on file.        3/15/2023 Dulce Knight M.D.

## 2023-11-06 LAB
CHOLEST SERPL-MSCNC: 198 MG/DL (ref 0–200)
HBA1C MFR BLD: 5.1 % (ref 4–6)
HDLC SERPL-MCNC: 38 MG/DL (ref 35–70)
LDLC SERPL CALC-MCNC: 137 MG/DL (ref 0–160)
TRIGL SERPL-MCNC: 116 MG/DL (ref 40–160)

## 2024-03-17 NOTE — PROGRESS NOTES
SUBJECTIVE:    Patient ID: Ameya Burleson is a 63 y.o. male.    Chief Complaint: Annual Exam    HPI  ANNUAL    Labs-glucose 97 BUN 19 Cr 1.14 Na 141 K 4.3 Thyroid normal H/H 16/48 wbc 4.8 platelet 108,000 PSA .74 chol 198 HDL 38  Trigs 116    He continues to work out-excellent physical shape      Health Maintenance         Date Due Completion Date    HIV Screening Never done ---    RSV Vaccine (Age 60+ and Pregnant patients) (1 - 1-dose 60+ series) Never done ---    PROSTATE-SPECIFIC ANTIGEN 11/18/2022 11/18/2021    Override on 5/8/2018: Done    TETANUS VACCINE 02/05/2023 2/5/2013    Override on 2/5/2013: Done    Influenza Vaccine (1) 09/01/2023 3/15/2023 (Declined)    Override on 3/15/2023: Declined    COVID-19 Vaccine (3 - 2023-24 season) 09/01/2023 10/4/2021    Hemoglobin A1c (Diabetic Prevention Screening) 11/18/2024 11/18/2021    Lipid Panel 11/18/2026 11/18/2021    Override on 5/8/2018: Done    Colorectal Cancer Screening 02/05/2031 2/5/2021    Override on 1/1/2000: Done ([facility])           Last 3 sets vitals    Testosterone remains low-he is taking injections  No visits with results within 1 Year(s) from this visit.   Latest known visit with results is:   Admission on 09/01/2021, Discharged on 09/02/2021   Component Date Value Ref Range Status    WBC 09/01/2021 7.62  3.90 - 12.70 K/uL Final    RBC 09/01/2021 5.15  4.60 - 6.20 M/uL Final    Hemoglobin 09/01/2021 15.8  14.0 - 18.0 g/dL Final    Hematocrit 09/01/2021 43.3  40.0 - 54.0 % Final    MCV 09/01/2021 84  82 - 98 fL Final    MCH 09/01/2021 30.7  27.0 - 31.0 pg Final    MCHC 09/01/2021 36.5 (H)  32.0 - 36.0 g/dL Final    RDW 09/01/2021 11.8  11.5 - 14.5 % Final    Platelets 09/01/2021 118 (L)  150 - 450 K/uL Final    MPV 09/01/2021 10.3  9.2 - 12.9 fL Final    Immature Granulocytes 09/01/2021 0.3  0.0 - 0.5 % Final    Gran # (ANC) 09/01/2021 4.8  1.8 - 7.7 K/uL Final    Immature Grans (Abs) 09/01/2021 0.02  0.00 - 0.04 K/uL Final     Lymph # 09/01/2021 1.9  1.0 - 4.8 K/uL Final    Mono # 09/01/2021 0.8  0.3 - 1.0 K/uL Final    Eos # 09/01/2021 0.1  0.0 - 0.5 K/uL Final    Baso # 09/01/2021 0.06  0.00 - 0.20 K/uL Final    nRBC 09/01/2021 0  0 /100 WBC Final    Gran % 09/01/2021 63.0  38.0 - 73.0 % Final    Lymph % 09/01/2021 24.3  18.0 - 48.0 % Final    Mono % 09/01/2021 10.9  4.0 - 15.0 % Final    Eosinophil % 09/01/2021 0.7  0.0 - 8.0 % Final    Basophil % 09/01/2021 0.8  0.0 - 1.9 % Final    Differential Method 09/01/2021 Automated   Final    Sodium 09/01/2021 135 (L)  136 - 145 mmol/L Final    Potassium 09/01/2021 3.5  3.5 - 5.1 mmol/L Final    Chloride 09/01/2021 100  95 - 110 mmol/L Final    CO2 09/01/2021 21 (L)  22 - 31 mmol/L Final    Glucose 09/01/2021 112 (H)  70 - 110 mg/dL Final    BUN 09/01/2021 25 (H)  9 - 21 mg/dL Final    Creatinine 09/01/2021 1.38  0.50 - 1.40 mg/dL Final    Calcium 09/01/2021 9.8  8.4 - 10.2 mg/dL Final    Total Protein 09/01/2021 7.9  6.0 - 8.4 g/dL Final    Albumin 09/01/2021 4.6  3.5 - 5.2 g/dL Final    Total Bilirubin 09/01/2021 0.8  0.2 - 1.3 mg/dL Final    Alkaline Phosphatase 09/01/2021 84  38 - 145 U/L Final    AST 09/01/2021 48  17 - 59 U/L Final    ALT 09/01/2021 31  0 - 50 U/L Final    Anion Gap 09/01/2021 14  8 - 16 mmol/L Final    eGFR if African American 09/01/2021 >60  >60 mL/min/1.73 m^2 Final    eGFR if non African American 09/01/2021 54 (A)  >60 mL/min/1.73 m^2 Final         Past Medical History:   Diagnosis Date    Hyperthyroidism     Hypothyroidism (acquired) 8/14/2019    Vitamin D deficiency 8/14/2019     History reviewed. No pertinent surgical history.  Family History   Problem Relation Age of Onset    Vision loss Mother     Diabetes Father     Cancer Maternal Grandmother         skin       Marital Status:   Alcohol History:  reports no history of alcohol use.  Tobacco History:  reports that he has never smoked. He has never used smokeless tobacco.  Drug History:  reports no history  of drug use.    Review of patient's allergies indicates:  No Known Allergies    Current Outpatient Medications:     cholecalciferol, vitamin D3, (VITAMIN D3) 50 mcg (2,000 unit) Tab, Take 50,000 Units by mouth 3 (three) times a week., Disp: , Rfl:     NASCOBAL 500 mcg/spray nasal spray, 500 mcg by Nasal route once a week., Disp: , Rfl:     NON FORMULARY MEDICATION, DIM, Disp: , Rfl:     NON FORMULARY MEDICATION, pregnenolone, Disp: , Rfl:     turmeric (CURCUMIN MISC), by Misc.(Non-Drug; Combo Route) route., Disp: , Rfl:     UNABLE TO FIND, Silverio cogi, Disp: , Rfl:     XYOSTED 75 mg/0.5 mL AtIn, Inject into the skin., Disp: , Rfl:     ARMOUR THYROID 120 mg Tab, Take 1 tablet (120 mg total) by mouth every morning., Disp: 90 tablet, Rfl: 3    tadalafiL (CIALIS) 5 MG tablet, Take 1 tablet (5 mg total) by mouth daily as needed for Erectile Dysfunction., Disp: 30 tablet, Rfl: 0    Review of Systems   Constitutional:  Negative for appetite change, chills, diaphoresis, fatigue, fever and unexpected weight change.   HENT:  Negative for congestion, ear pain, hearing loss, nosebleeds, postnasal drip, sinus pressure, sinus pain, sneezing, sore throat and trouble swallowing.    Eyes:  Negative for photophobia, pain and visual disturbance.   Respiratory:  Negative for apnea, cough, choking, chest tightness, shortness of breath and wheezing.    Cardiovascular:  Negative for chest pain, palpitations and leg swelling.   Gastrointestinal:  Negative for abdominal distention, abdominal pain, blood in stool, constipation, diarrhea, nausea and vomiting.   Endocrine: Negative for cold intolerance, heat intolerance, polydipsia and polyuria.   Genitourinary:  Negative for difficulty urinating, dysuria, flank pain, frequency, hematuria and urgency.   Musculoskeletal:  Negative for arthralgias, back pain, joint swelling, myalgias, neck pain and neck stiffness.   Skin:  Negative for pallor and rash.   Allergic/Immunologic: Negative for  "environmental allergies and food allergies.   Neurological:  Negative for dizziness, tremors, seizures, syncope, speech difficulty, weakness, light-headedness, numbness and headaches.   Hematological:  Does not bruise/bleed easily.   Psychiatric/Behavioral:  Negative for agitation, confusion, decreased concentration, sleep disturbance and suicidal ideas. The patient is not nervous/anxious.           Objective:          8/14/2019     3:40 PM 8/17/2020     8:26 AM 9/1/2021     9:04 PM 3/14/2022     2:41 PM 3/15/2023    10:10 AM 3/18/2024     8:12 AM   Vitals - 1 value per visit   SYSTOLIC 130 120 136 116 134 110   DIASTOLIC 82 74 76 78 82 72   Pulse 76 74 80 72 80 72   Temp 98.5 °F (36.9 °C) 97.8 °F (36.6 °C) 98.1 °F (36.7 °C) 98.5 °F (36.9 °C) 99 °F (37.2 °C) 98.2 °F (36.8 °C)   Resp 14 16 16 12 14 14   SPO2 98 % 98 % 99 % 97 % 98 % 97 %   Weight (lb) 180 181 183.2 186 180 179   Weight (kg) 81.647 82.101 83.1 84.369 81.647 81.194   Height 5' (1.524 m) 5' 9.5" (1.765 m) 5' 9" (1.753 m) 5' 9" (1.753 m) 5' 9" (1.753 m) 5' 9" (1.753 m)   BMI (Calculated) 35.2 26.4 27 27.5 26.6 26.4   Pain Score Zero Zero  Zero Zero Zero   (    Physical Exam  Vitals and nursing note reviewed.   Constitutional:       General: He is not in acute distress.     Appearance: Normal appearance. He is not ill-appearing.   HENT:      Head: Normocephalic and atraumatic.      Right Ear: Tympanic membrane normal.      Left Ear: Tympanic membrane normal.      Nose: Nose normal.      Mouth/Throat:      Mouth: Mucous membranes are moist.      Pharynx: Oropharynx is clear.   Eyes:      Extraocular Movements: Extraocular movements intact.      Conjunctiva/sclera: Conjunctivae normal.      Pupils: Pupils are equal, round, and reactive to light.   Neck:      Vascular: No carotid bruit.   Cardiovascular:      Rate and Rhythm: Normal rate and regular rhythm.      Pulses: Normal pulses.      Heart sounds: Normal heart sounds. No murmur heard.  Pulmonary:      " Effort: Pulmonary effort is normal.      Breath sounds: Normal breath sounds.   Abdominal:      General: Bowel sounds are normal. There is no distension.      Palpations: Abdomen is soft. There is no mass.      Tenderness: There is no abdominal tenderness.      Hernia: No hernia is present.   Musculoskeletal:      Cervical back: Normal range of motion and neck supple.      Right lower leg: No edema.      Left lower leg: No edema.   Lymphadenopathy:      Cervical: No cervical adenopathy.   Skin:     General: Skin is warm and dry.      Capillary Refill: Capillary refill takes less than 2 seconds.      Coloration: Skin is not jaundiced or pale.   Neurological:      General: No focal deficit present.      Mental Status: He is alert and oriented to person, place, and time.   Psychiatric:         Mood and Affect: Mood normal.         Thought Content: Thought content normal.         Judgment: Judgment normal.           Assessment:       1. Routine general medical examination at a health care facility    2. Acquired hypothyroidism    3. Thrombocytopenia    4. Special screening for malignant neoplasm of prostate         Plan:       Routine general medical examination at a health care facility    Acquired hypothyroidism  -     ARMOUR THYROID 120 mg Tab; Take 1 tablet (120 mg total) by mouth every morning.  Dispense: 90 tablet; Refill: 3    Thrombocytopenia    Special screening for malignant neoplasm of prostate    Other orders  -     tadalafiL (CIALIS) 5 MG tablet; Take 1 tablet (5 mg total) by mouth daily as needed for Erectile Dysfunction.  Dispense: 30 tablet; Refill: 0      Follow up in about 1 year (around 3/18/2025) for yearly exam.        3/19/2024 Dulce Knight M.D.

## 2024-03-18 ENCOUNTER — OFFICE VISIT (OUTPATIENT)
Dept: FAMILY MEDICINE | Facility: CLINIC | Age: 64
End: 2024-03-18
Payer: COMMERCIAL

## 2024-03-18 VITALS
BODY MASS INDEX: 26.51 KG/M2 | WEIGHT: 179 LBS | DIASTOLIC BLOOD PRESSURE: 72 MMHG | HEART RATE: 72 BPM | HEIGHT: 69 IN | RESPIRATION RATE: 14 BRPM | TEMPERATURE: 98 F | SYSTOLIC BLOOD PRESSURE: 110 MMHG | OXYGEN SATURATION: 97 %

## 2024-03-18 DIAGNOSIS — Z12.5 SPECIAL SCREENING FOR MALIGNANT NEOPLASM OF PROSTATE: ICD-10-CM

## 2024-03-18 DIAGNOSIS — D69.6 THROMBOCYTOPENIA: ICD-10-CM

## 2024-03-18 DIAGNOSIS — Z00.00 ROUTINE GENERAL MEDICAL EXAMINATION AT A HEALTH CARE FACILITY: Primary | ICD-10-CM

## 2024-03-18 DIAGNOSIS — E03.9 ACQUIRED HYPOTHYROIDISM: ICD-10-CM

## 2024-03-18 PROCEDURE — 99999 PR PBB SHADOW E&M-EST. PATIENT-LVL IV: CPT | Mod: PBBFAC,,, | Performed by: INTERNAL MEDICINE

## 2024-03-18 PROCEDURE — 3078F DIAST BP <80 MM HG: CPT | Mod: CPTII,S$GLB,, | Performed by: INTERNAL MEDICINE

## 2024-03-18 PROCEDURE — 3074F SYST BP LT 130 MM HG: CPT | Mod: CPTII,S$GLB,, | Performed by: INTERNAL MEDICINE

## 2024-03-18 PROCEDURE — 1159F MED LIST DOCD IN RCRD: CPT | Mod: CPTII,S$GLB,, | Performed by: INTERNAL MEDICINE

## 2024-03-18 PROCEDURE — 3008F BODY MASS INDEX DOCD: CPT | Mod: CPTII,S$GLB,, | Performed by: INTERNAL MEDICINE

## 2024-03-18 PROCEDURE — 99396 PREV VISIT EST AGE 40-64: CPT | Mod: S$GLB,,, | Performed by: INTERNAL MEDICINE

## 2024-03-18 RX ORDER — THYROID, PORCINE 120 MG/1
120 TABLET ORAL EVERY MORNING
Qty: 90 TABLET | Refills: 3 | Status: SHIPPED | OUTPATIENT
Start: 2024-03-18

## 2024-03-18 RX ORDER — TADALAFIL 5 MG/1
5 TABLET ORAL DAILY PRN
Qty: 30 TABLET | Refills: 0 | Status: SHIPPED | OUTPATIENT
Start: 2024-03-18 | End: 2025-03-18

## 2024-03-18 RX ORDER — TESTOSTERONE ENANTHATE 75 MG/.5ML
INJECTION SUBCUTANEOUS
COMMUNITY
Start: 2024-03-05

## 2024-05-31 ENCOUNTER — PATIENT OUTREACH (OUTPATIENT)
Dept: ADMINISTRATIVE | Facility: HOSPITAL | Age: 64
End: 2024-05-31
Payer: COMMERCIAL

## 2024-05-31 NOTE — PROGRESS NOTES
Population Health Chart Review & Patient Outreach Details      Additional Pop Health Notes:               Updates Requested / Reviewed:      Updated Care Coordination Note         Health Maintenance Topics Overdue:      VBHM Score: 0     Patient is not due for any topics at this time.    RSV Vaccine                  Health Maintenance Topic(s) Outreach Outcomes & Actions Taken:    Lab(s) - Outreach Outcomes & Actions Taken  : External Records Uploaded & Care Team Updated if Applicable